# Patient Record
Sex: FEMALE | URBAN - METROPOLITAN AREA
[De-identification: names, ages, dates, MRNs, and addresses within clinical notes are randomized per-mention and may not be internally consistent; named-entity substitution may affect disease eponyms.]

---

## 2023-07-14 ENCOUNTER — APPOINTMENT (RX ONLY)
Dept: URBAN - METROPOLITAN AREA CLINIC 54 | Facility: CLINIC | Age: 43
Setting detail: DERMATOLOGY
End: 2023-07-14

## 2023-07-14 DIAGNOSIS — Z41.9 ENCOUNTER FOR PROCEDURE FOR PURPOSES OTHER THAN REMEDYING HEALTH STATE, UNSPECIFIED: ICD-10-CM

## 2023-07-14 DIAGNOSIS — L81.1 CHLOASMA: ICD-10-CM

## 2023-07-14 PROCEDURE — ? COUNSELING

## 2023-07-14 PROCEDURE — 99203 OFFICE O/P NEW LOW 30 MIN: CPT

## 2023-07-14 PROCEDURE — ? OTHER

## 2023-07-14 PROCEDURE — ? TREATMENT REGIMEN

## 2023-07-14 PROCEDURE — ? COSMETIC CONSULTATION: LASER RESURFACING

## 2023-07-14 PROCEDURE — ? PRESCRIPTION

## 2023-07-14 RX ORDER — HYDROCORTISONE 25 MG/ML
LOTION TOPICAL BID
Qty: 118 | Refills: 0 | Status: ERX | COMMUNITY
Start: 2023-07-14

## 2023-07-14 RX ADMIN — HYDROCORTISONE: 25 LOTION TOPICAL at 00:00

## 2023-07-14 NOTE — PROCEDURE: OTHER
Other (Free Text): Advised patient to use hydroquinone,  hydrocortisone, and sunscreen  for pigmentation

## 2023-07-14 NOTE — PROCEDURE: TREATMENT REGIMEN
Continue Regimen: hydrocortisone 2.5 % lotion, Apply to affected area on face daily at night time
Detail Level: Zone